# Patient Record
Sex: FEMALE | Race: BLACK OR AFRICAN AMERICAN | NOT HISPANIC OR LATINO | URBAN - METROPOLITAN AREA
[De-identification: names, ages, dates, MRNs, and addresses within clinical notes are randomized per-mention and may not be internally consistent; named-entity substitution may affect disease eponyms.]

---

## 2018-09-11 ENCOUNTER — APPOINTMENT (RX ONLY)
Dept: URBAN - METROPOLITAN AREA CLINIC 130 | Facility: CLINIC | Age: 73
Setting detail: DERMATOLOGY
End: 2018-09-11

## 2018-09-11 DIAGNOSIS — L82.1 OTHER SEBORRHEIC KERATOSIS: ICD-10-CM

## 2018-09-11 DIAGNOSIS — L72.0 EPIDERMAL CYST: ICD-10-CM

## 2018-09-11 PROBLEM — F32.9 MAJOR DEPRESSIVE DISORDER, SINGLE EPISODE, UNSPECIFIED: Status: ACTIVE | Noted: 2018-09-11

## 2018-09-11 PROBLEM — I10 ESSENTIAL (PRIMARY) HYPERTENSION: Status: ACTIVE | Noted: 2018-09-11

## 2018-09-11 PROCEDURE — ? COUNSELING

## 2018-09-11 PROCEDURE — ? OTHER

## 2018-09-11 PROCEDURE — 99202 OFFICE O/P NEW SF 15 MIN: CPT

## 2018-09-11 ASSESSMENT — LOCATION SIMPLE DESCRIPTION DERM
LOCATION SIMPLE: RIGHT EAR
LOCATION SIMPLE: LEFT EYEBROW
LOCATION SIMPLE: RIGHT UPPER BACK

## 2018-09-11 ASSESSMENT — LOCATION DETAILED DESCRIPTION DERM
LOCATION DETAILED: RIGHT ANTITRAGUS
LOCATION DETAILED: RIGHT INFERIOR MEDIAL UPPER BACK
LOCATION DETAILED: LEFT MEDIAL EYEBROW

## 2018-09-11 ASSESSMENT — LOCATION ZONE DERM
LOCATION ZONE: TRUNK
LOCATION ZONE: EAR
LOCATION ZONE: FACE

## 2018-09-11 NOTE — PROCEDURE: OTHER
Other (Free Text): A. S. and ED CIG
Note Text (......Xxx Chief Complaint.): This diagnosis correlates with the
Detail Level: Simple